# Patient Record
Sex: FEMALE | URBAN - METROPOLITAN AREA
[De-identification: names, ages, dates, MRNs, and addresses within clinical notes are randomized per-mention and may not be internally consistent; named-entity substitution may affect disease eponyms.]

---

## 2017-06-07 ENCOUNTER — APPOINTMENT (RX ONLY)
Dept: URBAN - METROPOLITAN AREA CLINIC 170 | Facility: CLINIC | Age: 27
Setting detail: DERMATOLOGY
End: 2017-06-07

## 2017-06-07 DIAGNOSIS — Z41.9 ENCOUNTER FOR PROCEDURE FOR PURPOSES OTHER THAN REMEDYING HEALTH STATE, UNSPECIFIED: ICD-10-CM

## 2017-06-07 PROCEDURE — ? BOTOX

## 2017-06-07 NOTE — PROCEDURE: BOTOX
Additional Area 1 Location: Face
Periorbital Skin Units: 0
Consent: Written consent obtained. Risks include but not limited to lid/brow ptosis, bruising, swelling, diplopia, temporary effect, incomplete chemical denervation.
Post-Care Instructions: Patient instructed to not lie down for 4 hours and limit physical activity for 24 hours.
Detail Level: Zone
Dilution (U/0.1 Cc): 0.2
Additional Area 1 Units: 44.5
Expiration Date (Month Year): 11/19
Lot #:  [id number] * special

## 2017-08-22 ENCOUNTER — APPOINTMENT (RX ONLY)
Dept: URBAN - METROPOLITAN AREA CLINIC 170 | Facility: CLINIC | Age: 27
Setting detail: DERMATOLOGY
End: 2017-08-22

## 2017-08-22 DIAGNOSIS — Z41.9 ENCOUNTER FOR PROCEDURE FOR PURPOSES OTHER THAN REMEDYING HEALTH STATE, UNSPECIFIED: ICD-10-CM

## 2017-08-22 PROCEDURE — ? BOTOX

## 2017-08-22 NOTE — PROCEDURE: BOTOX
Dilution (U/0.1 Cc): 0.2
Glabellar Complex Units: 0
Post-Care Instructions: Patient instructed to not lie down for 4 hours and limit physical activity for 24 hours.
Consent: Written consent obtained. Risks include but not limited to lid/brow ptosis, bruising, swelling, diplopia, temporary effect, incomplete chemical denervation.
Detail Level: Zone
Expiration Date (Month Year): 3/20; 8/19*
Additional Area 1 Location: Face
Additional Area 1 Units: 28
Lot #:  [id number];  [id number]*

## 2017-12-13 ENCOUNTER — APPOINTMENT (RX ONLY)
Dept: URBAN - METROPOLITAN AREA CLINIC 170 | Facility: CLINIC | Age: 27
Setting detail: DERMATOLOGY
End: 2017-12-13

## 2017-12-13 DIAGNOSIS — Z41.9 ENCOUNTER FOR PROCEDURE FOR PURPOSES OTHER THAN REMEDYING HEALTH STATE, UNSPECIFIED: ICD-10-CM

## 2017-12-13 PROCEDURE — ? BOTOX

## 2017-12-13 NOTE — PROCEDURE: BOTOX
Lot #:  [id number]
Lateral Platysmal Bands Units: 0
Dilution (U/0.1 Cc): 0.2
Expiration Date (Month Year): 5/20
Additional Area 1 Units: 46.5
Consent: Written consent obtained. Risks include but not limited to lid/brow ptosis, bruising, swelling, diplopia, temporary effect, incomplete chemical denervation.
Post-Care Instructions: Patient instructed to not lie down for 4 hours and limit physical activity for 24 hours.
Additional Area 1 Location: Face
Detail Level: Zone

## 2018-03-20 ENCOUNTER — RX ONLY (OUTPATIENT)
Age: 28
Setting detail: RX ONLY
End: 2018-03-20

## 2018-03-20 RX ORDER — OSELTAMIVIR PHOSPHATE 75 MG/1
75 MG CAPSULE ORAL
Qty: 10 | Refills: 0 | Status: ERX | COMMUNITY
Start: 2018-03-20

## 2018-07-09 ENCOUNTER — APPOINTMENT (RX ONLY)
Dept: URBAN - METROPOLITAN AREA CLINIC 170 | Facility: CLINIC | Age: 28
Setting detail: DERMATOLOGY
End: 2018-07-09

## 2018-07-09 DIAGNOSIS — Z41.9 ENCOUNTER FOR PROCEDURE FOR PURPOSES OTHER THAN REMEDYING HEALTH STATE, UNSPECIFIED: ICD-10-CM

## 2018-07-09 PROCEDURE — ? BOTOX

## 2018-07-09 ASSESSMENT — LOCATION ZONE DERM: LOCATION ZONE: FACE

## 2018-07-09 ASSESSMENT — LOCATION SIMPLE DESCRIPTION DERM: LOCATION SIMPLE: LEFT CHEEK

## 2018-07-09 ASSESSMENT — LOCATION DETAILED DESCRIPTION DERM: LOCATION DETAILED: LEFT MEDIAL MALAR CHEEK

## 2018-07-09 NOTE — PROCEDURE: BOTOX
Periorbital Skin Units: 0
Detail Level: Zone
Lot #: [id number]
Expiration Date (Month Year): 12/20
Post-Care Instructions: Patient instructed to not lie down for 4 hours and limit physical activity for 24 hours. Patient instructed not to travel by airplane for 48 hours.
Additional Area 1 Units: 52
Additional Area 1 Location: Face
Consent: Written consent obtained. Risks include but not limited to lid/brow ptosis, bruising, swelling, diplopia, temporary effect, incomplete chemical denervation.
Dilution (U/0.1 Cc): 2

## 2018-07-12 ENCOUNTER — APPOINTMENT (RX ONLY)
Dept: URBAN - METROPOLITAN AREA CLINIC 170 | Facility: CLINIC | Age: 28
Setting detail: DERMATOLOGY
End: 2018-07-12

## 2018-07-12 DIAGNOSIS — Z41.9 ENCOUNTER FOR PROCEDURE FOR PURPOSES OTHER THAN REMEDYING HEALTH STATE, UNSPECIFIED: ICD-10-CM

## 2018-07-12 PROCEDURE — ? DYSPORT

## 2018-07-12 NOTE — HPI: OTHER
Condition:: Btx-a tx
Please Describe Your Condition:: comes in for Btx-a tx . No known contraindications to treatment with botulinum toxin See “Cosmetic Procedure” note in Attachments.

## 2018-07-12 NOTE — PROCEDURE: DYSPORT
Dilution (U/ 0.1cc): 10
Detail Level: Zone
Additional Area 4 Units: 0
Lot #: [id number]
Expiration Date (Month Year): 12/18
Additional Area 1 Units: 8
Additional Area 1 Location: Face
Consent: Written consent obtained. Risks include but not limited to lid/brow ptosis, bruising, swelling, diplopia, temporary effect, incomplete chemical denervation.

## 2019-01-08 ENCOUNTER — APPOINTMENT (RX ONLY)
Dept: URBAN - METROPOLITAN AREA CLINIC 167 | Facility: CLINIC | Age: 29
Setting detail: DERMATOLOGY
End: 2019-01-08

## 2019-01-08 DIAGNOSIS — Z41.9 ENCOUNTER FOR PROCEDURE FOR PURPOSES OTHER THAN REMEDYING HEALTH STATE, UNSPECIFIED: ICD-10-CM

## 2019-01-08 PROCEDURE — ? BOTOX

## 2019-01-08 NOTE — PROCEDURE: BOTOX
Additional Area 1 Location: face
Additional Area 3 Units: 0
Consent: Written consent obtained. Risks include but not limited to lid/brow ptosis, bruising, swelling, diplopia, temporary effect, incomplete chemical denervation.
Lot #: [id number] *CLINIC
Dilution (U/0.1 Cc): 0.2
Expiration Date (Month Year): 4/21
Detail Level: Zone
Additional Area 1 Units: 46.5
Post-Care Instructions: Patient instructed to not lie down for 4 hours and limit physical activity for 24 hours.

## 2019-03-06 ENCOUNTER — APPOINTMENT (RX ONLY)
Dept: URBAN - METROPOLITAN AREA CLINIC 167 | Facility: CLINIC | Age: 29
Setting detail: DERMATOLOGY
End: 2019-03-06

## 2019-03-06 DIAGNOSIS — Z41.9 ENCOUNTER FOR PROCEDURE FOR PURPOSES OTHER THAN REMEDYING HEALTH STATE, UNSPECIFIED: ICD-10-CM

## 2019-03-06 PROCEDURE — ? DYSPORT

## 2019-03-06 NOTE — PROCEDURE: DYSPORT
Nasal Root Units: 0
Lot #: [id number] *Clinic Staff Special
Additional Area 1 Location: Face
Expiration Date (Month Year): 08/19
Additional Area 1 Units: 8
Detail Level: Zone
Consent: Written consent obtained. Risks include but not limited to lid/brow ptosis, bruising, swelling, diplopia, temporary effect, incomplete chemical denervation.
Dilution (U/ 0.1cc): 10

## 2019-03-26 ENCOUNTER — APPOINTMENT (RX ONLY)
Dept: URBAN - METROPOLITAN AREA CLINIC 167 | Facility: CLINIC | Age: 29
Setting detail: DERMATOLOGY
End: 2019-03-26

## 2019-03-26 DIAGNOSIS — L70.0 ACNE VULGARIS: ICD-10-CM

## 2019-03-26 PROCEDURE — ? TREATMENT REGIMEN

## 2019-03-26 NOTE — PROCEDURE: TREATMENT REGIMEN
Hide Vanicream Products: No
Action 2: Start
Treatment 3: doxycycline
Sig For Treatment 2 (If Needed): once daily at bedtime
Action 4: Continue
Treatment 1: Tazorac
Sig For Treatment 1 (If Needed): once daily
Treatment 2: Veltin
Detail Level: Zone

## 2019-03-28 ENCOUNTER — APPOINTMENT (RX ONLY)
Dept: URBAN - METROPOLITAN AREA CLINIC 170 | Facility: CLINIC | Age: 29
Setting detail: DERMATOLOGY
End: 2019-03-28

## 2019-03-28 DIAGNOSIS — Z41.9 ENCOUNTER FOR PROCEDURE FOR PURPOSES OTHER THAN REMEDYING HEALTH STATE, UNSPECIFIED: ICD-10-CM

## 2019-03-28 PROCEDURE — ? PALOMAR ICON LASER

## 2019-03-28 ASSESSMENT — LOCATION SIMPLE DESCRIPTION DERM: LOCATION SIMPLE: LEFT CHEEK

## 2019-03-28 ASSESSMENT — LOCATION DETAILED DESCRIPTION DERM: LOCATION DETAILED: LEFT INFERIOR CENTRAL MALAR CHEEK

## 2019-03-28 ASSESSMENT — LOCATION ZONE DERM: LOCATION ZONE: FACE

## 2019-03-28 NOTE — HPI: COSMETIC (IPL)
Have You Had Laser Removal Of Brown Spots Before?: has not had previous treatment
When Outside In The Sun, Do You...: always burns, tans with difficulty
Eye Color: blue

## 2019-03-28 NOTE — PROCEDURE: PALOMAR ICON LASER
Overlap: 10%
Total Pulses (Will Not Render If Blank): 24
Pre-Procedure Text: The patients skin was cleaned and lux lotion applied.
Fluence Units: J/cm2
Pulse Duration (In Milliseconds): 10
Anesthesia Type: 1% lidocaine with epinephrine
Post-Care Instructions: I reviewed with the patient in detail post-care instructions. Patient should stay away from the sun and wear sun protection until treated areas are fully healed.
External Cooling: Giovanna Cryo 6
Number Of Passes: 1
Pulse Duration (In Milliseconds): 60
Fluence: 36
Anesthesia Volume In Cc: 0
Consent: Written consent obtained, risks reviewed including but not limited to crusting, scabbing, blistering, scarring, darker or lighter pigmentary change, bruising, and/or incomplete response.
External Cooling Fan Speed: 5
Detail Level: Zone
Render Post Care In The Note?: yes
Fluence: 14
Fluence: 32
Treated Area: small area

## 2019-04-10 ENCOUNTER — APPOINTMENT (RX ONLY)
Dept: URBAN - METROPOLITAN AREA CLINIC 170 | Facility: CLINIC | Age: 29
Setting detail: DERMATOLOGY
End: 2019-04-10

## 2019-04-10 DIAGNOSIS — Z41.9 ENCOUNTER FOR PROCEDURE FOR PURPOSES OTHER THAN REMEDYING HEALTH STATE, UNSPECIFIED: ICD-10-CM

## 2019-04-10 PROCEDURE — ? PALOMAR ICON LASER

## 2019-04-10 ASSESSMENT — LOCATION DETAILED DESCRIPTION DERM: LOCATION DETAILED: LEFT INFERIOR CENTRAL MALAR CHEEK

## 2019-04-10 ASSESSMENT — LOCATION ZONE DERM: LOCATION ZONE: FACE

## 2019-04-10 ASSESSMENT — LOCATION SIMPLE DESCRIPTION DERM: LOCATION SIMPLE: LEFT CHEEK

## 2019-04-10 NOTE — PROCEDURE: PALOMAR ICON LASER
Pulse Duration (In Milliseconds): 10
Pulse Duration (In Milliseconds): 40
Fluence: 14
Treated Area: small area
Anesthesia Volume In Cc: 0
Overlap: 10%
External Cooling Fan Speed: 5
Detail Level: Zone
Number Of Passes: 1
Total Pulses (Will Not Render If Blank): 38
Fluence Units: J/cm2
External Cooling: Giovanna Cryo 6
Fluence: 36
Fluence: 32
Consent: Written consent obtained, risks reviewed including but not limited to crusting, scabbing, blistering, scarring, darker or lighter pigmentary change, bruising, and/or incomplete response.
Treatment Number: 2
Render Post Care In The Note?: yes
Pre-Procedure Text: The patients skin was cleaned and lux lotion applied.
Anesthesia Type: 1% lidocaine with epinephrine
Post-Care Instructions: I reviewed with the patient in detail post-care instructions. Patient should stay away from the sun and wear sun protection until treated areas are fully healed.

## 2019-04-10 NOTE — HPI: COSMETIC (IPL)
Have You Had Laser Removal Of Brown Spots Before?: has had previous treatment
When Outside In The Sun, Do You...: always burns, tans with difficulty
When Was Your Last Ipl Treatment?: 3/28/19

## 2019-07-18 ENCOUNTER — APPOINTMENT (RX ONLY)
Dept: URBAN - METROPOLITAN AREA CLINIC 170 | Facility: CLINIC | Age: 29
Setting detail: DERMATOLOGY
End: 2019-07-18

## 2019-07-18 DIAGNOSIS — Z41.9 ENCOUNTER FOR PROCEDURE FOR PURPOSES OTHER THAN REMEDYING HEALTH STATE, UNSPECIFIED: ICD-10-CM

## 2019-07-18 PROCEDURE — ? DYSPORT

## 2019-07-18 PROCEDURE — ? BOTOX

## 2019-07-18 NOTE — PROCEDURE: DYSPORT
[place] Units: 0
Consent: Written consent obtained. Risks include but not limited to lid/brow ptosis, bruising, swelling, diplopia, temporary effect, incomplete chemical denervation.
Dilution (U/ 0.1cc): 10
Expiration Date (Month Year): 12/19
Additional Area 1 Units: 80
Lot #: [id number]
Detail Level: Zone
Additional Area 1 Location: Face

## 2019-07-18 NOTE — PROCEDURE: BOTOX
Depressor Anguli Oris Units: 0
Dilution (U/0.1 Cc): 4
Additional Area 1 Location: Face
Expiration Date (Month Year): 10/21
Consent: Written consent obtained. Risks include but not limited to lid/brow ptosis, bruising, swelling, diplopia, temporary effect, incomplete chemical denervation.
Lot #: [id number] *Clinic Special*
Additional Area 1 Units: 5
Detail Level: Zone

## 2019-07-31 ENCOUNTER — APPOINTMENT (RX ONLY)
Dept: URBAN - METROPOLITAN AREA CLINIC 170 | Facility: CLINIC | Age: 29
Setting detail: DERMATOLOGY
End: 2019-07-31

## 2019-07-31 DIAGNOSIS — Z41.9 ENCOUNTER FOR PROCEDURE FOR PURPOSES OTHER THAN REMEDYING HEALTH STATE, UNSPECIFIED: ICD-10-CM

## 2019-07-31 PROCEDURE — ? BOTOX

## 2019-07-31 NOTE — PROCEDURE: BOTOX
Anterior Platysmal Bands Units: 0
Additional Area 1 Location: Face
Lot #: [id number]
Expiration Date (Month Year): 10/21
Dilution (U/0.1 Cc): 4
Consent: Written consent obtained. Risks include but not limited to lid/brow ptosis, bruising, swelling, diplopia, temporary effect, incomplete chemical denervation.
Detail Level: Zone

## 2019-07-31 NOTE — HPI: OTHER
Condition:: Btx-a treatment
Please Describe Your Condition:: No known contraindications to treatment with botulinum toxin. see “Cosmetic Procedure” note in Attachments.

## 2019-12-31 ENCOUNTER — APPOINTMENT (RX ONLY)
Dept: URBAN - METROPOLITAN AREA CLINIC 173 | Facility: CLINIC | Age: 29
Setting detail: DERMATOLOGY
End: 2019-12-31

## 2019-12-31 DIAGNOSIS — Z41.9 ENCOUNTER FOR PROCEDURE FOR PURPOSES OTHER THAN REMEDYING HEALTH STATE, UNSPECIFIED: ICD-10-CM

## 2019-12-31 PROCEDURE — ? LASER GENESIS

## 2019-12-31 ASSESSMENT — LOCATION SIMPLE DESCRIPTION DERM: LOCATION SIMPLE: RIGHT FOREHEAD

## 2019-12-31 ASSESSMENT — LOCATION ZONE DERM: LOCATION ZONE: FACE

## 2019-12-31 ASSESSMENT — LOCATION DETAILED DESCRIPTION DERM: LOCATION DETAILED: RIGHT MEDIAL FOREHEAD

## 2019-12-31 NOTE — PROCEDURE: LASER GENESIS
Spot Size: 8 mm
Price (Use Numbers Only, No Special Characters Or $): 0
Detail Level: Detailed
Total Pulses: 9003
Consent: Written consent obtained, risks reviewed including but not limited to crusting, scabbing, blistering, scarring, darker or lighter pigmentary change, paradoxical hair regrowth, incomplete removal of hair and infection.
Endpoint: erythema. Ice applied. Post care reviewed with patient.
Fluence J/Cm2: 6
Post-Care Instructions: I reviewed with the patient in detail post-care instructions. Patient should avoid sun for a minimum of 4 weeks before and after treatment.
Pulse Duration: 0.3 msec
Treatment Number (Will Not Render If 0): 1
Laser Type: 1064nm
Repetition Rate: 8 Hz

## 2020-01-18 ENCOUNTER — APPOINTMENT (RX ONLY)
Dept: URBAN - METROPOLITAN AREA CLINIC 173 | Facility: CLINIC | Age: 30
Setting detail: DERMATOLOGY
End: 2020-01-18

## 2020-01-18 DIAGNOSIS — Z41.9 ENCOUNTER FOR PROCEDURE FOR PURPOSES OTHER THAN REMEDYING HEALTH STATE, UNSPECIFIED: ICD-10-CM

## 2020-01-18 PROCEDURE — ? BOTOX

## 2020-01-18 PROCEDURE — ? DYSPORT

## 2020-01-18 NOTE — PROCEDURE: BOTOX
Lateral Platysmal Bands Units: 0
Show Mentalis Units: No
Show Additional Area 4: Yes
Dilution (U/0.1 Cc): 4
Detail Level: Zone
Expiration Date (Month Year): 6/22
Additional Area 1 Location: Face
Additional Area 1 Units: 12
Lot #: [id number]
Consent: Written consent obtained. Risks include but not limited to lid/brow ptosis, bruising, swelling, diplopia, temporary effect, incomplete chemical denervation.

## 2020-01-18 NOTE — PROCEDURE: DYSPORT
Inferior Lateral Orbicularis Oculi Units: 0
Consent: Written consent obtained. Risks include but not limited to lid/brow ptosis, bruising, swelling, diplopia, temporary effect, incomplete chemical denervation.
Expiration Date (Month Year): 7/20
Detail Level: Zone
Additional Area 1 Units: 100
Dilution (U/ 0.1cc): 10
Additional Area 1 Location: face
Lot #: [id number]

## 2020-03-06 ENCOUNTER — APPOINTMENT (RX ONLY)
Dept: URBAN - METROPOLITAN AREA CLINIC 173 | Facility: CLINIC | Age: 30
Setting detail: DERMATOLOGY
End: 2020-03-06

## 2020-03-06 DIAGNOSIS — Z41.9 ENCOUNTER FOR PROCEDURE FOR PURPOSES OTHER THAN REMEDYING HEALTH STATE, UNSPECIFIED: ICD-10-CM

## 2020-03-06 PROCEDURE — ? DYSPORT

## 2020-03-06 PROCEDURE — ? BOTOX

## 2020-03-06 NOTE — HPI: OTHER
Condition:: Btx-a tx
Please Describe Your Condition:: comes in for Btx-a tx. No known contraindications to treatment with botulinum toxin. See cosmetic face sheet in Attachments.

## 2020-03-06 NOTE — PROCEDURE: BOTOX
Detail Level: Zone
Left Pupillary Line Units: 0
Consent: Written consent obtained. Risks include but not limited to lid/brow ptosis, bruising, swelling, diplopia, temporary effect, incomplete chemical denervation.
Lot #: [id number]
Show Additional Area 5: Yes
Show Right And Left Pupillary Line Units: No
Dilution (U/0.1 Cc): 4
Additional Area 1 Location: Face
Additional Area 1 Units: 10
Expiration Date (Month Year): 8/22

## 2020-03-06 NOTE — PROCEDURE: DYSPORT
Forehead Units: 0
Consent: Written consent obtained. Risks include but not limited to lid/brow ptosis, bruising, swelling, diplopia, temporary effect, incomplete chemical denervation.
Additional Area 1 Units: 35
Expiration Date (Month Year): 9/20
Dilution (U/ 0.1cc): 10
Detail Level: Zone
Additional Area 1 Location: face
Lot #: [id number]

## 2020-05-22 ENCOUNTER — APPOINTMENT (RX ONLY)
Dept: URBAN - METROPOLITAN AREA CLINIC 173 | Facility: CLINIC | Age: 30
Setting detail: DERMATOLOGY
End: 2020-05-22

## 2020-05-22 DIAGNOSIS — Z41.9 ENCOUNTER FOR PROCEDURE FOR PURPOSES OTHER THAN REMEDYING HEALTH STATE, UNSPECIFIED: ICD-10-CM

## 2020-05-22 PROCEDURE — ? BOTOX

## 2020-05-22 NOTE — HPI: OTHER
Condition:: Btx-a tx
Please Describe Your Condition:: is being seen for Btx-a tx . No known contraindications to treatment with botulinum toxin. See “Cosmetic Procedure”note in Attachments.

## 2020-05-22 NOTE — PROCEDURE: BOTOX
Additional Area 1 Location: Face
Show Additional Area 3: Yes
[facility] Units: 0
Show Right And Left Pupillary Line Units: No
Additional Area 1 Units: 15
Expiration Date (Month Year): 9/22
Detail Level: Zone
Lot #: [id number]
Dilution (U/0.1 Cc): 4
Consent: Written consent obtained. Risks include but not limited to lid/brow ptosis, bruising, swelling, diplopia, temporary effect, incomplete chemical denervation.

## 2020-07-30 ENCOUNTER — APPOINTMENT (RX ONLY)
Dept: URBAN - METROPOLITAN AREA CLINIC 173 | Facility: CLINIC | Age: 30
Setting detail: DERMATOLOGY
End: 2020-07-30

## 2020-07-30 DIAGNOSIS — Z41.9 ENCOUNTER FOR PROCEDURE FOR PURPOSES OTHER THAN REMEDYING HEALTH STATE, UNSPECIFIED: ICD-10-CM

## 2020-07-30 PROCEDURE — ? DYSPORT

## 2020-07-30 NOTE — PROCEDURE: DYSPORT
Show Depressor Anguli Units: Yes
Additional Area 1 Units: 106
Lcl Root Units: 0
Show Right And Left Brow Units: No
Expiration Date (Month Year): 11/30/20
Detail Level: Detailed
Lot #: [id number] *[facility]*
Dilution (U/ 0.1cc): 10
Consent: Written consent obtained. Risks include but not limited to lid/brow ptosis, bruising, swelling, diplopia, temporary effect, incomplete chemical denervation.
Additional Area 1 Location: face
Post-Care Instructions: Patient instructed to not lie down for 4 hours and limit physical activity for 24 hours.

## 2020-11-17 ENCOUNTER — APPOINTMENT (RX ONLY)
Dept: URBAN - METROPOLITAN AREA CLINIC 173 | Facility: CLINIC | Age: 30
Setting detail: DERMATOLOGY
End: 2020-11-17

## 2020-11-17 DIAGNOSIS — Z41.9 ENCOUNTER FOR PROCEDURE FOR PURPOSES OTHER THAN REMEDYING HEALTH STATE, UNSPECIFIED: ICD-10-CM

## 2020-11-17 PROCEDURE — ? BOTOX

## 2020-11-17 NOTE — PROCEDURE: BOTOX
Mentalis Units: 0
Show Additional Area 4: Yes
Expiration Date (Month Year): 06/23
Detail Level: Zone
Show Ucl Units: No
Lot #: [id number]
Dilution (U/0.1 Cc): 4
Consent: Written consent obtained. Risks include but not limited to lid/brow ptosis, bruising, swelling, diplopia, temporary effect, incomplete chemical denervation.
Additional Area 1 Location: Face
Additional Area 1 Units: 0.5

## 2021-01-14 ENCOUNTER — APPOINTMENT (RX ONLY)
Dept: URBAN - METROPOLITAN AREA CLINIC 173 | Facility: CLINIC | Age: 31
Setting detail: DERMATOLOGY
End: 2021-01-14

## 2021-01-14 DIAGNOSIS — Z41.9 ENCOUNTER FOR PROCEDURE FOR PURPOSES OTHER THAN REMEDYING HEALTH STATE, UNSPECIFIED: ICD-10-CM

## 2021-01-14 PROCEDURE — ? FILLERS

## 2021-01-14 PROCEDURE — ? DYSPORT

## 2021-01-14 PROCEDURE — ? BOTOX

## 2021-01-14 NOTE — PROCEDURE: FILLERS
Additional Area 5 Volume In Cc: 0
Post-Care Instructions: Patient instructed to apply ice to reduce swelling.
Include Cannula Information In Note?: No
Lot #: [id number]
Additional Anesthesia Volume In Cc: 6
Additional Area 1 Location: Face
Expiration Date (Month Year): 01/20
Additional Area 1 Volume In Cc: 1
Detail Level: Zone
Filler: RHA 2
Map Statment: See Attach Map for Complete Details
Filler: Restylane Kysse
Anesthesia Type: 1% lidocaine without epinephrine
Lot #: [id number]
Expiration Date (Month Year): 04/12/23
Lot #: [id number] *[facility] *
Anesthesia Volume In Cc: 0.5
Expiration Date (Month Year): 03/22
Consent: Written consent obtained. Risks include but not limited to bruising, beading, irregular texture, ulceration, infection, allergic reaction, scar formation, incomplete augmentation, temporary nature, procedural pain.

## 2021-01-14 NOTE — HPI: OTHER
Condition:: Filler/btx-a tx
Please Describe Your Condition:: is being seen for Filler/btx-a tx . No known contraindications to soft tissue augmentation with ILEANA; no known contraindications to treatment with botulinum toxin. See “Cosmetic Procedure”note in Attachments.

## 2021-01-14 NOTE — PROCEDURE: BOTOX
Show Glabellar Units: Yes
Additional Area 6 Units: 0
Show Mentalis Units: No
Detail Level: Zone
Expiration Date (Month Year): 09/23
Lot #: [id number]
Additional Area 1 Location: Face
Consent: Written consent obtained. Risks include but not limited to lid/brow ptosis, bruising, swelling, diplopia, temporary effect, incomplete chemical denervation.
Dilution (U/0.1 Cc): 4
Additional Area 1 Units: 16

## 2021-01-14 NOTE — PROCEDURE: DYSPORT
Dilution (U/ 0.1cc): 10
Show Levator Superior Units: Yes
Left Periorbital Units: 0
Show Mentalis Units: No
Additional Area 1 Location: Face
Consent: Written consent obtained. Risks include but not limited to lid/brow ptosis, bruising, swelling, diplopia, temporary effect, incomplete chemical denervation.
Expiration Date (Month Year): 06/21
Additional Area 1 Units: 100
Lot #: [id number]
Detail Level: Zone

## 2021-03-08 ENCOUNTER — APPOINTMENT (RX ONLY)
Dept: URBAN - METROPOLITAN AREA CLINIC 173 | Facility: CLINIC | Age: 31
Setting detail: DERMATOLOGY
End: 2021-03-08

## 2021-03-08 DIAGNOSIS — Z41.9 ENCOUNTER FOR PROCEDURE FOR PURPOSES OTHER THAN REMEDYING HEALTH STATE, UNSPECIFIED: ICD-10-CM

## 2021-03-08 PROCEDURE — ? CLEAR AND BRILLIANT LASER

## 2021-03-08 ASSESSMENT — LOCATION ZONE DERM: LOCATION ZONE: FACE

## 2021-03-08 ASSESSMENT — LOCATION SIMPLE DESCRIPTION DERM: LOCATION SIMPLE: LEFT FOREHEAD

## 2021-03-08 ASSESSMENT — LOCATION DETAILED DESCRIPTION DERM: LOCATION DETAILED: LEFT INFERIOR MEDIAL FOREHEAD

## 2021-03-08 NOTE — HPI: COSMETIC (LASER RESURFACING)
Eye Color: blue
Have You Had Laser Resurfacing Before?: has not had previous treatments
When Outside In The Sun, Do You...: always burns, tans with difficulty

## 2021-04-09 ENCOUNTER — APPOINTMENT (RX ONLY)
Dept: URBAN - METROPOLITAN AREA CLINIC 173 | Facility: CLINIC | Age: 31
Setting detail: DERMATOLOGY
End: 2021-04-09

## 2021-04-09 DIAGNOSIS — Z41.9 ENCOUNTER FOR PROCEDURE FOR PURPOSES OTHER THAN REMEDYING HEALTH STATE, UNSPECIFIED: ICD-10-CM

## 2021-04-09 PROCEDURE — ? CLEAR AND BRILLIANT LASER

## 2021-04-09 ASSESSMENT — LOCATION ZONE DERM: LOCATION ZONE: FACE

## 2021-04-09 ASSESSMENT — LOCATION DETAILED DESCRIPTION DERM: LOCATION DETAILED: LEFT INFERIOR MEDIAL FOREHEAD

## 2021-04-09 ASSESSMENT — LOCATION SIMPLE DESCRIPTION DERM: LOCATION SIMPLE: LEFT FOREHEAD

## 2021-04-09 NOTE — HPI: COSMETIC (LASER RESURFACING)
Have You Had Laser Resurfacing Before?: has had previous treatments
When Was Your Last Laser Resurfacing Treatment?: 03/08/2021

## 2021-04-09 NOTE — PROCEDURE: CLEAR AND BRILLIANT LASER
Laser Type: Clear and Brilliant Permea 1927nm
Length Of Topical Anesthesia Application (Optional): 60 minutes
Topical Anesthesia?: 30% lidocaine
Detail Level: Zone
Energy Level: Low
Post-Procedure Care: Skin Better Trio and Elta MD 44 tinted was applied. Post care reviewed with patient.
Total Pulses: 4 passes on cheeks, 2 passes on perioral, forehead, temples, and nose.
Consent: Written consent obtained, risks reviewed including but not limited to crusting, scabbing, blistering, scarring, darker or lighter pigmentary change, incidental hair removal, bruising, and/or incomplete removal.
Post-Care Instructions: I reviewed with the patient in detail post-care instructions. Patient should stay away from the sun and wear sun protection until treated areas are fully healed.

## 2021-06-25 ENCOUNTER — APPOINTMENT (RX ONLY)
Dept: URBAN - METROPOLITAN AREA CLINIC 173 | Facility: CLINIC | Age: 31
Setting detail: DERMATOLOGY
End: 2021-06-25

## 2021-06-25 DIAGNOSIS — Z41.9 ENCOUNTER FOR PROCEDURE FOR PURPOSES OTHER THAN REMEDYING HEALTH STATE, UNSPECIFIED: ICD-10-CM

## 2021-06-25 PROCEDURE — ? LASER GENESIS

## 2021-06-25 ASSESSMENT — LOCATION ZONE DERM: LOCATION ZONE: FACE

## 2021-06-25 ASSESSMENT — LOCATION DETAILED DESCRIPTION DERM: LOCATION DETAILED: INFERIOR MID FOREHEAD

## 2021-06-25 ASSESSMENT — LOCATION SIMPLE DESCRIPTION DERM: LOCATION SIMPLE: INFERIOR FOREHEAD

## 2021-06-25 NOTE — PROCEDURE: LASER GENESIS
External Cooling Fan Speed: 0
Endpoint: Trio and sunscreen applied. Post care reviewed with patient.
Treatment Number (Will Not Render If 0): 2
Laser Type: 1064nm
Detail Level: Zone
Fluence J/Cm2: 6
Pulse Duration: 0.3 msec
Spot Size: 8 mm
Tolerated Procedure (Optional): Tolerated Well
Total Pulses: 13925
Repetition Rate: 7 Hz
Post-Care Instructions: I reviewed with the patient in detail post-care instructions. Patient should avoid sun for a minimum of 4 weeks before and after treatment.
Consent: Written consent obtained, risks reviewed including but not limited to crusting, scabbing, blistering, scarring, darker or lighter pigmentary change, paradoxical hair regrowth, incomplete removal of hair and infection.

## 2021-07-07 ENCOUNTER — APPOINTMENT (RX ONLY)
Dept: URBAN - METROPOLITAN AREA CLINIC 173 | Facility: CLINIC | Age: 31
Setting detail: DERMATOLOGY
End: 2021-07-07

## 2021-07-07 DIAGNOSIS — Z41.9 ENCOUNTER FOR PROCEDURE FOR PURPOSES OTHER THAN REMEDYING HEALTH STATE, UNSPECIFIED: ICD-10-CM

## 2021-07-07 PROCEDURE — ? DYSPORT

## 2021-07-07 PROCEDURE — ? BOTOX

## 2021-07-07 NOTE — PROCEDURE: DYSPORT
Levator Labii Superioris Units: 0
Show Ucl Units: No
Show Topical Anesthesia: Yes
Lot #: [id number] *Clinic Staff Special*
Dilution (U/ 0.1cc): 10
Expiration Date (Month Year): 03/22
Additional Area 1 Location: Face
Detail Level: Zone
Consent: Written consent obtained. Risks include but not limited to lid/brow ptosis, bruising, swelling, diplopia, temporary effect, incomplete chemical denervation.
Additional Area 1 Units: 100

## 2021-07-07 NOTE — PROCEDURE: BOTOX
Additional Area 5 Units: 0
Show Right And Left Brow Units: No
Show Additional Area 5: Yes
Additional Area 1 Location: Face
Additional Area 1 Units: 20
Expiration Date (Month Year): 10/23
Detail Level: Zone
Consent: Written consent obtained. Risks include but not limited to lid/brow ptosis, bruising, swelling, diplopia, temporary effect, incomplete chemical denervation.
Dilution (U/0.1 Cc): 4
Lot #: [id number] *Clinic Staff Special*

## 2023-05-01 ENCOUNTER — APPOINTMENT (RX ONLY)
Dept: URBAN - METROPOLITAN AREA CLINIC 176 | Facility: CLINIC | Age: 33
Setting detail: DERMATOLOGY
End: 2023-05-01

## 2023-05-01 VITALS — HEIGHT: 64 IN | WEIGHT: 139 LBS

## 2023-05-01 DIAGNOSIS — L57.8 OTHER SKIN CHANGES DUE TO CHRONIC EXPOSURE TO NONIONIZING RADIATION: ICD-10-CM

## 2023-05-01 DIAGNOSIS — D485 NEOPLASM OF UNCERTAIN BEHAVIOR OF SKIN: ICD-10-CM

## 2023-05-01 DIAGNOSIS — D22 MELANOCYTIC NEVI: ICD-10-CM

## 2023-05-01 PROBLEM — D22.71 MELANOCYTIC NEVI OF RIGHT LOWER LIMB, INCLUDING HIP: Status: ACTIVE | Noted: 2023-05-01

## 2023-05-01 PROBLEM — D22.39 MELANOCYTIC NEVI OF OTHER PARTS OF FACE: Status: ACTIVE | Noted: 2023-05-01

## 2023-05-01 PROBLEM — D48.5 NEOPLASM OF UNCERTAIN BEHAVIOR OF SKIN: Status: ACTIVE | Noted: 2023-05-01

## 2023-05-01 PROCEDURE — ? COUNSELING

## 2023-05-01 PROCEDURE — 99213 OFFICE O/P EST LOW 20 MIN: CPT | Mod: 25

## 2023-05-01 PROCEDURE — 11305 SHAVE SKIN LESION 0.5 CM/<: CPT

## 2023-05-01 PROCEDURE — ? SHAVE REMOVAL

## 2023-05-01 PROCEDURE — 11305 SHAVE SKIN LESION 0.5 CM/<: CPT | Mod: 76

## 2023-05-01 ASSESSMENT — LOCATION SIMPLE DESCRIPTION DERM
LOCATION SIMPLE: LEFT FOREHEAD
LOCATION SIMPLE: POSTERIOR NECK
LOCATION SIMPLE: RIGHT THIGH
LOCATION SIMPLE: CHEST
LOCATION SIMPLE: LEFT TEMPLE

## 2023-05-01 ASSESSMENT — LOCATION DETAILED DESCRIPTION DERM
LOCATION DETAILED: LEFT MEDIAL SUPERIOR CHEST
LOCATION DETAILED: LEFT MEDIAL TEMPLE
LOCATION DETAILED: LEFT MEDIAL TRAPEZIAL NECK
LOCATION DETAILED: RIGHT ANTERIOR LATERAL PROXIMAL THIGH
LOCATION DETAILED: LEFT MEDIAL FOREHEAD
LOCATION DETAILED: RIGHT MEDIAL TRAPEZIAL NECK

## 2023-05-01 ASSESSMENT — LOCATION ZONE DERM
LOCATION ZONE: TRUNK
LOCATION ZONE: LEG
LOCATION ZONE: NECK
LOCATION ZONE: FACE

## 2023-05-01 NOTE — PROCEDURE: SHAVE REMOVAL
Medical Necessity Information: It is in your best interest to select a reason for this procedure from the list below. All of these items fulfill various CMS LCD requirements except the new and changing color options.
Medical Necessity Clause: This procedure was medically necessary because the lesion that was treated was:
Lab: [id number]
Lab Facility: [id number]
Detail Level: Detailed
Was A Bandage Applied: Yes
Size Of Lesion In Cm (Required): 0.4
X Size Of Lesion In Cm (Optional): 0
Depth Of Shave: dermis
Biopsy Method: Dermablade
Anesthesia Type: 0.5% lidocaine with 1:400,000 epinephrine
Hemostasis: Drysol
Wound Care: Polysporin ointment
Render Path Notes In Note?: No
Consent was obtained from the patient. The risks and benefits to therapy were discussed in detail. Specifically, the risks of infection, scarring, bleeding, prolonged wound healing, incomplete removal, allergy to anesthesia, nerve injury and recurrence were addressed. Prior to the procedure, the treatment site was clearly identified and confirmed by the patient. All components of Universal Protocol/PAUSE Rule completed.
Post-Care Instructions: I reviewed with the patient in detail post-care instructions. Patient is to keep the biopsy site dry overnight, and then apply bacitracin twice daily until healed. Patient may apply hydrogen peroxide soaks to remove any crusting.
Notification Instructions: Patient will be notified of pathology results. However, patient instructed to call the office if not contacted within 2 weeks.
Billing Type: Third-Party Bill
Lab: [id number]
Lab Facility: [id number]
Size Of Lesion In Cm (Required): 0.5
Billing Type: Third-Party Bill

## 2023-05-24 ENCOUNTER — RX ONLY (OUTPATIENT)
Age: 33
Setting detail: RX ONLY
End: 2023-05-24

## 2023-05-24 RX ORDER — TRIAMCINOLONE ACETONIDE 1 MG/G
CREAM TOPICAL
Qty: 30 | Refills: 0 | Status: ERX | COMMUNITY
Start: 2023-05-24

## 2024-05-01 ENCOUNTER — APPOINTMENT (RX ONLY)
Dept: URBAN - METROPOLITAN AREA CLINIC 176 | Facility: CLINIC | Age: 34
Setting detail: DERMATOLOGY
End: 2024-05-01

## 2024-05-01 DIAGNOSIS — L57.8 OTHER SKIN CHANGES DUE TO CHRONIC EXPOSURE TO NONIONIZING RADIATION: ICD-10-CM

## 2024-05-01 DIAGNOSIS — D22 MELANOCYTIC NEVI: ICD-10-CM

## 2024-05-01 PROBLEM — D22.39 MELANOCYTIC NEVI OF OTHER PARTS OF FACE: Status: ACTIVE | Noted: 2024-05-01

## 2024-05-01 PROBLEM — D22.71 MELANOCYTIC NEVI OF RIGHT LOWER LIMB, INCLUDING HIP: Status: ACTIVE | Noted: 2024-05-01

## 2024-05-01 PROCEDURE — ? COUNSELING

## 2024-05-01 PROCEDURE — 99212 OFFICE O/P EST SF 10 MIN: CPT

## 2024-05-01 ASSESSMENT — LOCATION SIMPLE DESCRIPTION DERM
LOCATION SIMPLE: CHEST
LOCATION SIMPLE: LEFT FOREHEAD
LOCATION SIMPLE: LEFT TEMPLE
LOCATION SIMPLE: RIGHT THIGH

## 2024-05-01 ASSESSMENT — LOCATION ZONE DERM
LOCATION ZONE: TRUNK
LOCATION ZONE: LEG
LOCATION ZONE: FACE

## 2024-05-01 ASSESSMENT — LOCATION DETAILED DESCRIPTION DERM
LOCATION DETAILED: RIGHT ANTERIOR LATERAL PROXIMAL THIGH
LOCATION DETAILED: LEFT MEDIAL FOREHEAD
LOCATION DETAILED: LEFT MEDIAL TEMPLE
LOCATION DETAILED: LEFT MEDIAL SUPERIOR CHEST